# Patient Record
Sex: MALE
[De-identification: names, ages, dates, MRNs, and addresses within clinical notes are randomized per-mention and may not be internally consistent; named-entity substitution may affect disease eponyms.]

---

## 2022-07-30 ENCOUNTER — TELEPHONE ENCOUNTER (OUTPATIENT)
Age: 62
End: 2022-07-30

## 2022-07-31 ENCOUNTER — TELEPHONE ENCOUNTER (OUTPATIENT)
Age: 62
End: 2022-07-31

## 2025-01-16 ENCOUNTER — OFFICE VISIT (OUTPATIENT)
Dept: URBAN - METROPOLITAN AREA CLINIC 7 | Facility: CLINIC | Age: 65
End: 2025-01-16

## 2025-05-20 ENCOUNTER — OFFICE VISIT (OUTPATIENT)
Dept: URBAN - METROPOLITAN AREA CLINIC 7 | Facility: CLINIC | Age: 65
End: 2025-05-20

## 2025-05-20 ENCOUNTER — OFFICE VISIT (OUTPATIENT)
Dept: URBAN - METROPOLITAN AREA CLINIC 7 | Facility: CLINIC | Age: 65
End: 2025-05-20
Payer: COMMERCIAL

## 2025-05-20 ENCOUNTER — DASHBOARD ENCOUNTERS (OUTPATIENT)
Age: 65
End: 2025-05-20

## 2025-05-20 ENCOUNTER — LAB OUTSIDE AN ENCOUNTER (OUTPATIENT)
Dept: URBAN - METROPOLITAN AREA CLINIC 7 | Facility: CLINIC | Age: 65
End: 2025-05-20

## 2025-05-20 ENCOUNTER — TELEPHONE ENCOUNTER (OUTPATIENT)
Dept: URBAN - METROPOLITAN AREA CLINIC 7 | Facility: CLINIC | Age: 65
End: 2025-05-20

## 2025-05-20 DIAGNOSIS — I49.9 CARDIAC ARRHYTHMIA, UNSPECIFIED CARDIAC ARRHYTHMIA TYPE: ICD-10-CM

## 2025-05-20 DIAGNOSIS — E78.2 MIXED HYPERLIPIDEMIA: ICD-10-CM

## 2025-05-20 DIAGNOSIS — Z86.0100 HX OF COLONIC POLYPS: ICD-10-CM

## 2025-05-20 DIAGNOSIS — I10 ESSENTIAL HYPERTENSION: ICD-10-CM

## 2025-05-20 DIAGNOSIS — I25.10 CAD IN NATIVE ARTERY: ICD-10-CM

## 2025-05-20 PROBLEM — 428283002: Status: ACTIVE | Noted: 2025-05-20

## 2025-05-20 PROBLEM — 53741008: Status: ACTIVE | Noted: 2025-05-20

## 2025-05-20 PROBLEM — 59621000: Status: ACTIVE | Noted: 2025-05-20

## 2025-05-20 PROBLEM — 698247007: Status: ACTIVE | Noted: 2025-05-20

## 2025-05-20 PROBLEM — 267434003: Status: ACTIVE | Noted: 2025-05-20

## 2025-05-20 PROCEDURE — 99204 OFFICE O/P NEW MOD 45 MIN: CPT | Performed by: INTERNAL MEDICINE

## 2025-05-20 RX ORDER — LISINOPRIL 2.5 MG/1
TAKE 1 TABLET BY MOUTH EVERY DAY TABLET ORAL
Qty: 90 EACH | Refills: 2 | Status: ACTIVE | COMMUNITY

## 2025-05-20 RX ORDER — SITAGLIPTIN 100 MG/1
TAKE 1 TABLET BY MOUTH EVERY DAY TABLET, FILM COATED ORAL
Qty: 90 EACH | Refills: 0 | Status: ACTIVE | COMMUNITY

## 2025-05-20 RX ORDER — SIMVASTATIN 40 MG/1
TAKE 1 TABLET BY MOUTH EVERY DAY IN THE EVENING TABLET, FILM COATED ORAL
Qty: 90 EACH | Refills: 1 | Status: ACTIVE | COMMUNITY

## 2025-05-20 RX ORDER — IBUPROFEN SODIUM 256 MG/1
1 TABLET WITH FOOD OR MILK AS NEEDED TABLET, COATED ORAL THREE TIMES A DAY
Status: ACTIVE | COMMUNITY
Start: 2025-05-20

## 2025-05-20 RX ORDER — RIVAROXABAN 20 MG/1
TAKE 1 TABLET BY MOUTH EVERY DAY WITH DINNER FOR 90 DAYS TABLET, FILM COATED ORAL
Qty: 90 EACH | Refills: 0 | Status: ACTIVE | COMMUNITY

## 2025-05-20 RX ORDER — METOPROLOL TARTRATE 25 MG/1
TABLET ORAL
Qty: 180 TABLET | Status: ACTIVE | COMMUNITY

## 2025-05-20 NOTE — HPI-TODAY'S VISIT:
Hx polyps and due for repeat  colonoscopy. Cardiopulmonary comorbidities include CAD s/p angioplasty and stent 2008. Remains on xeralto  anticoagulation. No recent nsaid use history. No fevers or chills No nausea or vomiting No weight loss No change in bowel habits No bleeding.

## 2025-05-23 ENCOUNTER — TELEPHONE ENCOUNTER (OUTPATIENT)
Dept: URBAN - METROPOLITAN AREA CLINIC 7 | Facility: CLINIC | Age: 65
End: 2025-05-23

## 2025-05-23 RX ORDER — SODIUM, POTASSIUM,MAG SULFATES 17.5-3.13G
AS DIRECTED SOLUTION, RECONSTITUTED, ORAL ORAL
Qty: 1 | Refills: 0 | OUTPATIENT
Start: 2025-05-23 | End: 2025-05-25

## 2025-05-29 ENCOUNTER — OFFICE VISIT (OUTPATIENT)
Dept: URBAN - METROPOLITAN AREA SURGERY CENTER 5 | Facility: SURGERY CENTER | Age: 65
End: 2025-05-29
Payer: COMMERCIAL

## 2025-05-29 ENCOUNTER — TELEPHONE ENCOUNTER (OUTPATIENT)
Dept: URBAN - METROPOLITAN AREA CLINIC 7 | Facility: CLINIC | Age: 65
End: 2025-05-29

## 2025-05-29 DIAGNOSIS — Z86.0100 PERSONAL HISTORY OF COLON POLYPS, UNSPECIFIED: ICD-10-CM

## 2025-05-29 DIAGNOSIS — K57.30 DIVERTICULOSIS OF LARGE INTESTINE WITHOUT PERFORATION OR ABSCESS WITHOUT BLEEDING: ICD-10-CM

## 2025-05-29 DIAGNOSIS — K63.5 COLON POLYP: ICD-10-CM

## 2025-05-29 DIAGNOSIS — K64.1 SECOND DEGREE HEMORRHOIDS: ICD-10-CM

## 2025-05-29 PROCEDURE — 45385 COLONOSCOPY W/LESION REMOVAL: CPT | Performed by: INTERNAL MEDICINE

## 2025-05-29 PROCEDURE — 0529F INTRVL 3/>YR PTS CLNSCP DOCD: CPT | Performed by: INTERNAL MEDICINE
